# Patient Record
Sex: FEMALE | Race: WHITE
[De-identification: names, ages, dates, MRNs, and addresses within clinical notes are randomized per-mention and may not be internally consistent; named-entity substitution may affect disease eponyms.]

---

## 2016-09-20 VITALS
SYSTOLIC BLOOD PRESSURE: 115 MMHG | SYSTOLIC BLOOD PRESSURE: 115 MMHG | DIASTOLIC BLOOD PRESSURE: 60 MMHG | DIASTOLIC BLOOD PRESSURE: 60 MMHG

## 2019-02-22 ENCOUNTER — HOSPITAL ENCOUNTER (OUTPATIENT)
Dept: HOSPITAL 61 - CT | Age: 74
Discharge: HOME | End: 2019-02-22
Attending: INTERNAL MEDICINE
Payer: MEDICARE

## 2019-02-22 DIAGNOSIS — K80.20: Primary | ICD-10-CM

## 2019-02-22 DIAGNOSIS — M43.16: ICD-10-CM

## 2019-02-22 PROCEDURE — 74176 CT ABD & PELVIS W/O CONTRAST: CPT

## 2019-02-22 NOTE — RAD
PQRS Compliance statement:

 

One or more of the following individualized dose reduction techniques were

utilized for this examination:

1. Automated exposure control.

2. Adjustment of the mA and/or kV according to patient size.

3. Use of iterative reconstruction technique.

 

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 

 

TECHNIQUE: CT abdomen and pelvis without IV contrast with multiplanar 

reformats.

 

COMPARISON: 5/9/2006

 

FINDINGS:

Limited evaluation of solid abdominal and pelvic organs due to lack of IV 

contrast. Heart is normal in size. No pericardial or pleural effusion. 

Mild bibasilar subsegmental atelectasis or scarring. Noncontrast 

appearance of the liver, spleen, pancreas, adrenals within normal limits. 

Numerous gallstones noted. No pericholecystic fluid or inflammatory 

changes. No nephrolithiasis or hydronephrosis. 8mm partially exophytic 

lesion is seen in the lateral aspect of the interpolar right kidney 

(series 2 image 81).

8 mm partially exophytic lesion is seen in the anterior lower right kidney

(series 2 image 85) (.

9 mm partially exophytic high attenuating lesion in the inferior left 

kidney (series 2 image 88). No enlarged retroperitoneal or pelvic 

adenopathy. No free pelvic fluid or ascites. Laxity of anterior midline 

abdominal wall. Right lower quadrant ostomy is seen. Large amount of stool

is seen in the colon. Wing's pouch is seen in the pelvis. Anteverted 

uterus. Urinary bladder is within normal limits. No pneumoperitoneum. No 

bowel wall thickening or surrounding inflammatory changes. No suspicious 

bony lesion. Grade 1 anterolisthesis of L4 over L5. Right L4 pars defect.

 

IMPRESSION:

Limited exam due to lack of IV and oral contrast for evaluation of 

stricture or solid organs.

1. Large amount of stool in the colon, patient may be constipated.

2. No perienteric or pericolonic inflammatory changes to suggest acute 

inflammatory bowel disease.

3. Bilateral indeterminate renal lesions most likely simple and minimally 

complicated cysts. Nonemergent MRI of the abdomen with IV contrast can be 

obtained for further evaluation.

4. Cholelithiasis without imaging evidence of acute cholecystitis.

 

Electronically signed by: Lm Christian DO (2/22/2019 1:56 PM) [id number]

## 2019-02-26 ENCOUNTER — HOSPITAL ENCOUNTER (OUTPATIENT)
Dept: HOSPITAL 61 - MRI | Age: 74
Discharge: HOME | End: 2019-02-26
Attending: INTERNAL MEDICINE
Payer: MEDICARE

## 2019-02-26 DIAGNOSIS — N28.1: ICD-10-CM

## 2019-02-26 DIAGNOSIS — K76.89: ICD-10-CM

## 2019-02-26 DIAGNOSIS — K80.20: Primary | ICD-10-CM

## 2019-02-26 LAB
CREAT SERPL-MCNC: 1.5 MG/DL (ref 0.6–1)
GFR SERPLBLD BASED ON 1.73 SQ M-ARVRAT: 34.2 ML/MIN

## 2019-02-26 PROCEDURE — 74183 MRI ABD W/O CNTR FLWD CNTR: CPT

## 2019-02-26 PROCEDURE — 82565 ASSAY OF CREATININE: CPT

## 2019-02-26 PROCEDURE — 36415 COLL VENOUS BLD VENIPUNCTURE: CPT

## 2019-02-26 PROCEDURE — A9585 GADOBUTROL INJECTION: HCPCS

## 2019-02-26 NOTE — RAD
ABDOMEN WO/W CONTRAST

 

Clinical Indication: BILATERAL RENAL LESIONS, ABNORMAL CT. 

 

Comparison: CT abdomen and pelvis without contrast, February 22, 2019.

 

TECHNIQUE: Routine multiplanar multiple pulse sequence images of the 

abdomen are obtained before and after 7 cc Gadavist IV contrast.

 

Findings: 

Hydropic gallbladder. There is cholelithiasis. No gallbladder wall 

thickening or pericholecystic fluid. Extrahepatic duct is normal caliber. 

No evidence of choledocholithiasis.

 

There is no fatty infiltration of the liver. The adrenal glands are 

normal.

 

The pancreatic duct is normal. The pancreas is homogeneous.

 

There are 2 nearly 1 cm in size cortical cysts in the upper pole of the 

right kidney. There are tiny bilateral cortical cysts. Subcentimeter 

cortical cyst lower pole of left kidney. None of these cysts enhance on 

postcontrast images. There is no hydronephrosis. 

There is a 1 cm partially exophytic lesion at the lateral lower pole of 

the right kidney that is T1 hyperintense and intermediate T2 signal but 

does not enhance on postcontrast images. There is a 0.7 cm partially 

exophytic lesion at the lower pole of the left kidney that is T1 

hyperintense but does not enhance on postcontrast images. These lesions 

may be a hemorrhagic or proteinaceous cysts.

 

Small amount of free fluid and edema just inferior to the tip of the right

hepatic lobe. There is prominent stool filled colon in the right lower 

abdomen. Question uterine fibroid measuring 4.9 cm.

 

IMPRESSION:

1.  There are 2 small probable hemorrhagic or proteinaceous cysts, one in 

the lower pole of the right kidney and one in the left lower pole. There 

are other bilateral simple cortical cysts.

2.  Cholelithiasis.

3.  Question moderate-sized uterine fibroid.

 

Electronically signed by: Damien Ramirez MD (2/26/2019 4:47 PM) [id number]

## 2020-07-15 ENCOUNTER — HOSPITAL ENCOUNTER (EMERGENCY)
Dept: HOSPITAL 63 - ER | Age: 75
Discharge: ANOTHER HOSPITAL | End: 2020-07-15
Payer: COMMERCIAL

## 2020-07-15 VITALS — DIASTOLIC BLOOD PRESSURE: 52 MMHG | SYSTOLIC BLOOD PRESSURE: 108 MMHG

## 2020-07-15 VITALS — BODY MASS INDEX: 29.38 KG/M2 | HEIGHT: 65 IN | WEIGHT: 176.37 LBS

## 2020-07-15 DIAGNOSIS — A40.3: Primary | ICD-10-CM

## 2020-07-15 DIAGNOSIS — R65.21: ICD-10-CM

## 2020-07-15 DIAGNOSIS — Z98.890: ICD-10-CM

## 2020-07-15 DIAGNOSIS — F17.210: ICD-10-CM

## 2020-07-15 DIAGNOSIS — M25.551: ICD-10-CM

## 2020-07-15 DIAGNOSIS — Z20.818: ICD-10-CM

## 2020-07-15 DIAGNOSIS — Z88.2: ICD-10-CM

## 2020-07-15 DIAGNOSIS — N17.9: ICD-10-CM

## 2020-07-15 DIAGNOSIS — G43.909: ICD-10-CM

## 2020-07-15 LAB
% BANDS: 3 % (ref 0–9)
% LYMPHS: 16 % (ref 24–48)
% MONOS: 5 % (ref 0–10)
% SEGS: 76 % (ref 35–66)
ALBUMIN SERPL-MCNC: 2.3 G/DL (ref 3.4–5)
ALBUMIN/GLOB SERPL: 0.8 {RATIO} (ref 1–1.7)
ALP SERPL-CCNC: 76 U/L (ref 46–116)
ALT SERPL-CCNC: 24 U/L (ref 14–59)
ANION GAP SERPL CALC-SCNC: 11 MMOL/L (ref 6–14)
ANISOCYTOSIS BLD QL SMEAR: SLIGHT
APTT PPP: YELLOW S
AST SERPL-CCNC: 29 U/L (ref 15–37)
BACTERIA #/AREA URNS HPF: (no result) /HPF
BASOPHILS # BLD AUTO: 0 X10^3/UL (ref 0–0.2)
BASOPHILS NFR BLD: 0 % (ref 0–3)
BGAS PCO2: 39 MMHG (ref 35–45)
BGAS PH: 7.37 (ref 7.35–7.45)
BGAS PO2: 262 MMHG (ref 71–100)
BILIRUB SERPL-MCNC: 0.6 MG/DL (ref 0.2–1)
BILIRUB UR QL STRIP: (no result)
BUN/CREAT SERPL: 16 (ref 6–20)
CA-I SERPL ISE-MCNC: 97 MG/DL (ref 7–20)
CALCIUM SERPL-MCNC: 9.4 MG/DL (ref 8.5–10.1)
CHLORIDE SERPL-SCNC: 95 MMOL/L (ref 98–107)
CO2 SERPL-SCNC: 28 MMOL/L (ref 21–32)
CREAT SERPL-MCNC: 6 MG/DL (ref 0.6–1)
DELTA BASE BGAS: -3 MMOL/L (ref 0–3)
EOSINOPHIL NFR BLD: 0 % (ref 0–3)
EOSINOPHIL NFR BLD: 0 X10^3/UL (ref 0–0.7)
ERYTHROCYTE [DISTWIDTH] IN BLOOD BY AUTOMATED COUNT: 17.7 % (ref 11.5–14.5)
FIBRINOGEN PPP-MCNC: (no result) MG/DL
GFR SERPLBLD BASED ON 1.73 SQ M-ARVRAT: 6.9 ML/MIN
GLOBULIN SER-MCNC: 2.8 G/DL (ref 2.2–3.8)
GLUCOSE SERPL-MCNC: 85 MG/DL (ref 70–99)
GLUCOSE UR STRIP-MCNC: (no result) MG/DL
HCT VFR BLD CALC: 30.1 % (ref 36–47)
HGB BLD-MCNC: 9.5 G/DL (ref 12–15.5)
HYALINE CASTS #/AREA URNS LPF: (no result) /HPF
HYPOCHROMIA BLD QL SMEAR: SLIGHT
LYMPHOCYTES # BLD: 1.2 X10^3/UL (ref 1–4.8)
LYMPHOCYTES NFR BLD AUTO: 13 % (ref 24–48)
MCH RBC QN AUTO: 25 PG (ref 25–35)
MCHC RBC AUTO-ENTMCNC: 32 G/DL (ref 31–37)
MCV RBC AUTO: 78 FL (ref 79–100)
MONO #: 1.3 X10^3/UL (ref 0–1.1)
MONOCYTES NFR BLD: 14 % (ref 0–9)
NEUT #: 7 X10^3UL (ref 1.8–7.7)
NEUTROPHILS NFR BLD AUTO: 73 % (ref 31–73)
NITRITE UR QL STRIP: (no result)
O2 SAT BGAS: 100 % (ref 92–99)
O2/TOTAL GAS SETTING VFR VENT: 100 %
PLATELET # BLD AUTO: 252 X10^3/UL (ref 140–400)
PLATELET # BLD EST: ADEQUATE 10*3/UL
POTASSIUM SERPL-SCNC: 4.4 MMOL/L (ref 3.5–5.1)
PROT SERPL-MCNC: 5.1 G/DL (ref 6.4–8.2)
RBC # BLD AUTO: 3.84 X10^6/UL (ref 3.5–5.4)
RBC #/AREA URNS HPF: (no result) /HPF (ref 0–2)
SODIUM SERPL-SCNC: 134 MMOL/L (ref 136–145)
SP GR UR STRIP: 1.02
SQUAMOUS #/AREA URNS LPF: (no result) /LPF
UROBILINOGEN UR-MCNC: 0.2 MG/DL
WBC # BLD AUTO: 9.6 X10^3/UL (ref 4–11)

## 2020-07-15 PROCEDURE — 85025 COMPLETE CBC W/AUTO DIFF WBC: CPT

## 2020-07-15 PROCEDURE — 85007 BL SMEAR W/DIFF WBC COUNT: CPT

## 2020-07-15 PROCEDURE — 83605 ASSAY OF LACTIC ACID: CPT

## 2020-07-15 PROCEDURE — 71045 X-RAY EXAM CHEST 1 VIEW: CPT

## 2020-07-15 PROCEDURE — 87070 CULTURE OTHR SPECIMN AEROBIC: CPT

## 2020-07-15 PROCEDURE — 96365 THER/PROPH/DIAG IV INF INIT: CPT

## 2020-07-15 PROCEDURE — 70450 CT HEAD/BRAIN W/O DYE: CPT

## 2020-07-15 PROCEDURE — 82803 BLOOD GASES ANY COMBINATION: CPT

## 2020-07-15 PROCEDURE — 87040 BLOOD CULTURE FOR BACTERIA: CPT

## 2020-07-15 PROCEDURE — 36556 INSERT NON-TUNNEL CV CATH: CPT

## 2020-07-15 PROCEDURE — 93005 ELECTROCARDIOGRAM TRACING: CPT

## 2020-07-15 PROCEDURE — 99291 CRITICAL CARE FIRST HOUR: CPT

## 2020-07-15 PROCEDURE — 96366 THER/PROPH/DIAG IV INF ADDON: CPT

## 2020-07-15 PROCEDURE — 73501 X-RAY EXAM HIP UNI 1 VIEW: CPT

## 2020-07-15 PROCEDURE — 84484 ASSAY OF TROPONIN QUANT: CPT

## 2020-07-15 PROCEDURE — 96375 TX/PRO/DX INJ NEW DRUG ADDON: CPT

## 2020-07-15 PROCEDURE — 80053 COMPREHEN METABOLIC PANEL: CPT

## 2020-07-15 PROCEDURE — 87205 SMEAR GRAM STAIN: CPT

## 2020-07-15 PROCEDURE — 36415 COLL VENOUS BLD VENIPUNCTURE: CPT

## 2020-07-15 PROCEDURE — 51702 INSERT TEMP BLADDER CATH: CPT

## 2020-07-15 PROCEDURE — 81001 URINALYSIS AUTO W/SCOPE: CPT

## 2020-07-15 RX ADMIN — AMIODARONE HYDROCHLORIDE PRN MLS/HR: 50 INJECTION, SOLUTION INTRAVENOUS at 08:50

## 2020-07-15 RX ADMIN — AMIODARONE HYDROCHLORIDE PRN MLS/HR: 50 INJECTION, SOLUTION INTRAVENOUS at 08:56

## 2020-07-15 NOTE — EKG
[facility] [address], [place] [address]

Test Date:    2020-07-15               Test Time:    08:54:56

Pat Name:     HAYDEE STEWART              Department:   

Patient ID:   [id number]           Room:          

Gender:       F                        Technician:   

:          1945               Requested By: RADHA BUCK

Order Number: [id number]            Reading MD:     

                                 Measurements

Intervals                              Axis          

Rate:         85                       P:            62

ND:           168                      QRS:          -34

QRSD:         112                      T:            108

QT:           398                                    

QTc:          480                                    

                           Interpretive Statements

SINUS RHYTHM

ABNORMAL LEFT AXIS DEVIATION

R-S TRANSITION ZONE IN V LEADS DISPLACED TO THE LEFT

LEFT ANTERIOR FASCICULAR BLOCK

T ABNORMALITY IN HIGH LATERAL LEADS

PROLONGED QT

ABNORMAL ECG

RI6.02

No previous ECG available for comparison

## 2020-07-15 NOTE — RAD
CHEST AP ONLY

 

History: Reason: altered mental / Spl. Instructions:  / History: 

 

Comparison: May 13, 2015

 

Findings:

Ill-defined patchy reticular opacities bilaterally most prominent within 

the upper lobes. Left basilar linear atelectasis or scarring, unchanged. 

Elevation the right hemidiaphragm. No pleural effusion. No pneumothorax. 

Normal heart size. Bilateral glenohumeral DJD.

 

Impression: 

1.  Ill-defined patchy reticular opacities bilaterally, may represent 

infectious process including viral pneumonia.

 

Electronically signed by: Sergio Rsuso DO (7/15/2020 8:54 AM) [id number]

## 2020-07-15 NOTE — PHYS DOC
Past History


Past Medical History:  Migraines, Other


Past Surgical History:  Other


Smoking:  Cigarettes


Alcohol Use:  None


Drug Use:  None





General Adult


EDM:


Chief Complaint:  ALTERED MENTAL STATUS





HPI:


HPI:





Patient is a 76 yo f biba ams


had hip surgery





Apparently over the last 24 hours patient has become more confused not following

commands as well at home blood pressure was 60/30 for the paramedics.


Patient has a history of polymyalgia rheumatica takes prednisone daily had hip 

surgery after a fall on June 17 had a hip repair was unstable in the operating 

room thought related to some sort of a cement complication possibly pulmonary 

hypertension from the cement.  She improved and stabilized and was discharged 

she now is home with daughter daughter tells me that she is a DO NOT RESUSCITATE

she showed me the power of .  Additional past medical history includes 

asthma A. fib with RVR anemia chronic kidney disease depression diverticulosis 

baseline creatinine was a 1.0 at admission last month.  History of arthritis 

history of N STEMI history of migraine long history multiple previous surgical 

history as well medications amiodarone Eliquis iron Lasix Xanax albuterol Norco 

but she has not taken it since yesterday lisinopril prednisone she is currently 

on 30 mg a day but did not take it since yesterday.  Allergic to sulfa and 

iodine.





Review of Systems:


Review of Systems:


frazier by pain.





Heart Score:


Risk Factors:


Risk Factors:  DM, Current or recent (<one month) smoker, HTN, HLP, family 

history of CAD, obesity.


Risk Scores:


Score 0 - 3:  2.5% MACE over next 6 weeks - Discharge Home


Score 4 - 6:  20.3% MACE over next 6 weeks - Admit for Clinical Observation


Score 7 - 10:  72.7% MACE over next 6 weeks - Early Invasive Strategies





Current Medications:


Current Meds:





Current Medications








 Medications


  (Trade)  Dose


 Ordered  Sig/Mariella  Start Time


 Stop Time Status Last Admin


Dose Admin


 


 Sodium Chloride  2,400 ml @ 


 2,400 mls/hr  Q1H  7/15/20 08:30


   UNV  





 


 Vancomycin HCl


  (Vanco Per


 Pharmacy)  1 each  PRN DAILY  PRN  7/15/20 08:30


   UNV  














Allergies:


Allergies:





Allergies








Coded Allergies Type Severity Reaction Last Updated Verified


 


  iodine Allergy Severe  5/13/15 No


 


  adhesive tape Adverse Reaction Intermediate Rash 11/15/15 No











Physical Exam:


PE:





Constitutional: Well developed, ill appearing.


HENT: Normocephalic, atraumatic, bilateral external ears normal, oropharynx dry,

no oral exudates, nose normal. []


Eyes: PERRLA, EOMI, conjunctiva normal, no discharge. [] 


Neck: Normal range of motion, no tenderness, supple, no stridor. [] 


Cardiovascular:Heart rate regular rhythm, no murmur []


Lungs & Thorax:  Bilateral breath sounds clear to auscultation ANTERIORLY


Abdomen: Bowel sounds normal, soft, no tenderness, no masses, no pulsatile 

masses. [] 


Skin: AREA of erythema induration noted right hip may be some fluctuance 

underlying the surgical incision.


Back: No tenderness, no CVA tenderness. [] 


Extremities: No tenderness, no cyanosis, no clubbing, ROM intact, no edema. [] 


Neurologic EYES OPEN TO VOICE, DOES FOLLOW COMMANDS.





Current Patient Data:


Vital Signs:


Afebrile hypotensive oxygen saturation is adequate on 2 L.





EKG:


EKG:


[] Sinus rhythm rate of 85  no STEMI interpreted by me time of encounter.





Radiology/Procedures:


Radiology/Procedures:


phenoid sinus probably mucous retention cyst or polyp.   


 


IMPRESSION:


 


 


1. No acute intracranial findings.


 


2. Left sphenoid sinus mucous retention cyst or polyp. 


 


Electronically signed by: Brent Perez MD (7/15/2020 8:58 AM) [id number]


[]


 


Impression: 


1.  No acute osseous abnormality.


2.  Possible right lateral gluteal subcutaneous gas although overlying 


structures degrades evaluation. If further clinical concern repeat 


radiographs with removal of external structures can further assess.


3.  Right hip arthroplasty.


4.  Advanced lower lumbar spondylosis.


 


Electronically signed by: Sergio Russo DO (7/15/2020 8:58 AM) [id number]














DICTATED AND SIGNED BY:     SERGIO RUSSO DO


DATE:     07/15/20 0858





CC: NEYMAR QUINN MD; RADHA BUCK MD ~


Impressions:


the upper lobes. Left basilar linear atelectasis or scarring, unchanged. 


Elevation the right hemidiaphragm. No pleural effusion. No pneumothorax. 


Normal heart size. Bilateral glenohumeral DJD.


 


Impression: 


1.  Ill-defined patchy reticular opacities bilaterally, may represent 


infectious process including viral pneumonia.


 


Electronically signed by: Sergio Russo DO (7/15/2020 8:54 AM) [id number]














DICTATED AND SIGNED BY:     SERGIO RUSSO DO


DATE:     07/15/20 0854





CC: NEYMAR QUINN MD; RADHA BUCK MD ~





Course & Med Decision Making:


Course & Med Decision Making


Pertinent Labs and Imaging studies reviewed. (See chart for details)





[] 75-year-old female multiple medical problems polymyalgia rheumatica A. fib on

 Eliquis acute on chronic diastolic heart failure with history of pulmonary 

hypertension prior DVT and PE fibromyalgia multiple issues basically presenting 

with septic shock related to could be a combination of pneumonia and or wound 

infection.





covid test ordered, pending, ppe worn throughout.





d/w dr alejo from [facility] transfer service, accepted patient.  Patient 

remained stable while in the emergency room on 0.2 mics per kilogram per minute 

of levo fed.  116/46 with a map of 71 at 12:40 PM


We did collect a wound culture we gave aggressive antibiotics Vanco and Zosyn CT

 head was negative chest x-ray showed possible viral pneumonia my read not 

classic for COVID it is on the differential that is pending.  Dr. Arianna Alejo was notified about that.  I do believe that the benefits of transfer 

for continuity of care outweigh any risks, also patient is hypotensive needs 

acute critical care services.  Also will need nephrology consultation which we 

do not have available here.  Also family request transfer back to the facility 

where surgery was performed which I think is extremely reasonable.








Give 30 cc/kg of IV fluids and aggressive antibiotics in the emergency room





Dragon Disclaimer:


Dragon Disclaimer:


This electronic medical record was generated, in whole or in part, using a voice

 recognition dictation system.





Departure


Departure:


Impression:  


   Primary Impression:  


   Septic shock


   Additional Impression:  


   Acute kidney injury


Disposition:  02 XFER SHT-TRM HOSP


Condition:  GUARDED





Justification of Admission:


Justification of Admission:


Justification of Admission Dx:  Yes


Sepsis:  Hemodynamic Instability





CENTRAL LINE PLACEMENT


CENTRAL LINE PLACEMENT:


Location:


left femoral vein catheter indication septic shock poor iv access


successful first attempt. sutured in place.


Occupation of :  Attending Physician


PICC Team Member?:  No


Line exchanged over guidewire?:  Yes


Central Line Indications:  Poor peripheral access


Hand Hygiene Performed?:  Yes


Maximum Sterile Barriers Used:  Mask, Sterile Gown, Sterile Gloves, Large 

Sterile Drape, Cap


Skin Preperation: (Check All):  Chlorhexidine Gluconate


Prep dry @ Skin Puncture:  Yes


Patient < 2 months of age?:  No


Known allergy to CHG?:  No


Insertion Site:  Femoral


Antimicrobial Catheter Used?:  Yes


Central Line Catheter Type?:  Non-tunneled


Successful Placement?:  Yes





Sepsis Assessment


Date and Time of Assessment


Date:  Jul 15, 2020


Time:  09:34





Vital Signs


Vital Signs





Vital Signs








  Date Time  Temp Pulse Resp B/P (MAP) Pulse Ox O2 Delivery O2 Flow Rate FiO2


 


7/15/20 08:05 97.7 86 11 89/39 (56) 100 NonRebreather Mask 15.0 











Respirations


Respiratory Effort:  Normal


Respiratory Pattern:  Normal





Cardiovascular


Pulse Rhythm:  Regular


HEART:  Nml rate, reg. rhythm





Lung Sounds


Breath Sounds:  Clear





Capillary Refill


Capillary Refill:  Rt Foot < 3 seconds





Peripheral Pulse


Pulse Location:  Radial


Pulse Strength:  Normal (2+)


Pulse Assessment Method:  Monitor





Integumentary


Skin:  Warm, Dry (Patient's mental status is somewhat improved blood pressure is

 better)





Critical Care Note


Total Time (mins):  50


Comments


For management of acute critical illness septic shock including discussion with 

consultants review of labs besdie care evaluation of treatments.











RADHA BUCK MD            Jul 15, 2020 08:28

## 2020-07-15 NOTE — RAD
CT HEAD

 

INDICATION: Altered mental status

 

COMPARISON: None Available.

 

Exposure: One or more of the following individualized dose reduction 

techniques were utilized for this examination:  1. Automated exposure 

control  2. Adjustment of the mA and/or kV according to patient size  3. 

Use of iterative reconstruction technique

 

TECHNIQUE: 5 mm contiguous axial images were obtained from the skull base 

to the vertex in both bone and soft tissue algorithm.  

 

FINDINGS: 

 

 

No abnormal attenuation within the brain parenchyma.  

 

No evidence of acute intracranial hemorrhage.   No extra-axial fluid 

collections.

 

No mass effect or midline shift. Ventricular size is appropriate.  Basal 

cisterns are patent.

 

No fractures identified.Gray-white differentiation is preserved.Globes and

orbits are within normal limits.   Complete opacification of the left 

sphenoid sinus probably mucous retention cyst or polyp.   

 

IMPRESSION:

 

 

1. No acute intracranial findings.

 

2. Left sphenoid sinus mucous retention cyst or polyp. 

 

Electronically signed by: Brent Perez MD (7/15/2020 8:58 AM) [id number]

## 2020-07-15 NOTE — RAD
HIP RIGHT 1 VIEW WITH PELVIS

 

History: Reason: altered mental / Spl. Instructions:  / History: 

 

Technique: AP view the pelvis and 2 additional views of the right hip.

 

Comparison: CT November 15, 2015

 

Findings:

Right hip arthroplasty. Normal alignment of the hip. No fracture. Advanced

lower lumbar spondylosis and postoperative changes. Left inguinal femoral 

catheter with tip projecting over the left pelvis. Postop changes 

overlying the pelvis. Right lower quadrant ostomy.

 

Possible right lateral luteal subcutaneous gas and overlying structures 

degrades evaluation.

 

Impression: 

1.  No acute osseous abnormality.

2.  Possible right lateral gluteal subcutaneous gas although overlying 

structures degrades evaluation. If further clinical concern repeat 

radiographs with removal of external structures can further assess.

3.  Right hip arthroplasty.

4.  Advanced lower lumbar spondylosis.

 

Electronically signed by: Sergio Russo DO (7/15/2020 8:58 AM) [id number]